# Patient Record
Sex: FEMALE | Race: BLACK OR AFRICAN AMERICAN | Employment: PART TIME | URBAN - METROPOLITAN AREA
[De-identification: names, ages, dates, MRNs, and addresses within clinical notes are randomized per-mention and may not be internally consistent; named-entity substitution may affect disease eponyms.]

---

## 2018-04-29 ENCOUNTER — APPOINTMENT (OUTPATIENT)
Dept: ULTRASOUND IMAGING | Facility: HOSPITAL | Age: 38
End: 2018-04-29
Attending: EMERGENCY MEDICINE
Payer: MEDICAID

## 2018-04-29 ENCOUNTER — HOSPITAL ENCOUNTER (EMERGENCY)
Facility: HOSPITAL | Age: 38
Discharge: HOME | End: 2018-04-29
Attending: EMERGENCY MEDICINE
Payer: MEDICAID

## 2018-04-29 VITALS
HEIGHT: 66 IN | DIASTOLIC BLOOD PRESSURE: 78 MMHG | RESPIRATION RATE: 14 BRPM | HEART RATE: 70 BPM | OXYGEN SATURATION: 99 % | TEMPERATURE: 98 F | SYSTOLIC BLOOD PRESSURE: 127 MMHG | BODY MASS INDEX: 22.18 KG/M2 | WEIGHT: 138 LBS

## 2018-04-29 DIAGNOSIS — O03.9 MISCARRIAGE: Primary | ICD-10-CM

## 2018-04-29 PROCEDURE — 84702 CHORIONIC GONADOTROPIN TEST: CPT | Performed by: EMERGENCY MEDICINE

## 2018-04-29 PROCEDURE — 76801 OB US < 14 WKS SINGLE FETUS: CPT | Performed by: EMERGENCY MEDICINE

## 2018-04-29 PROCEDURE — 99284 EMERGENCY DEPT VISIT MOD MDM: CPT

## 2018-04-29 PROCEDURE — 86850 RBC ANTIBODY SCREEN: CPT | Performed by: EMERGENCY MEDICINE

## 2018-04-29 PROCEDURE — 86900 BLOOD TYPING SEROLOGIC ABO: CPT | Performed by: EMERGENCY MEDICINE

## 2018-04-29 PROCEDURE — 36415 COLL VENOUS BLD VENIPUNCTURE: CPT

## 2018-04-29 PROCEDURE — 86901 BLOOD TYPING SEROLOGIC RH(D): CPT | Performed by: EMERGENCY MEDICINE

## 2018-04-29 PROCEDURE — 76817 TRANSVAGINAL US OBSTETRIC: CPT | Performed by: EMERGENCY MEDICINE

## 2018-04-29 PROCEDURE — 85025 COMPLETE CBC W/AUTO DIFF WBC: CPT | Performed by: EMERGENCY MEDICINE

## 2018-04-29 NOTE — ED INITIAL ASSESSMENT (HPI)
Pt to the ed with vaginal bleeding for the past two weeks pt is 7 weeks pregnant and she states that she seen her OB on 4/25/18 and was informed that she was having a miscarriage and given the option to allow her miscarriage to happen naturally.  Pt complai

## 2018-04-30 NOTE — ED PROVIDER NOTES
Patient Seen in: [facility] AND [facility] Emergency Department     History   No chief complaint on file.     Stated Complaint: Vaginal Bleeding     HPI    40year old female reported to be 7 weeks pregnant, but in the process of having a miscarriage–was evaluated Head: Normocephalic and atraumatic. Head is without raccoon's eyes, without right periorbital erythema and without left periorbital erythema.    Nose: Nose normal.   Mouth/Throat: Mucous membranes are normal. Mucous membranes are not pale and not cyanotic Narrative: The following orders were created for panel order TYPE AND SCREEN.   Procedure                               Abnormality         Status                     ---------                               -----------         ------ Sushila Martinez MD on 4/29/2018 at 20:35         Approved by (CST): Sushila Martinez MD on 4/29/2018 at 20:38                 ED Medications Administered: Medications - No data to display      Procedures: None     04/29/18  1720 04/29/18  19 2045  ------------------------------------------------------------    Impression:  After review and interpretation of the above emergency department workup, the patient was found to have Miscarriage  (primary encounter diagnosis)    Plan: General supportiv

## 2018-04-30 NOTE — ED NOTES
Pt presents to ED for eval of active miscarriage, c/o vaginal bleeding and lower abd camping. States she spoke with her OB and was educated about stated symptoms however reports increased bleeding.

## 2018-04-30 NOTE — ED NOTES
Pt stable for dc home. Pt given written and verbal dc instructions. Pt verbalizes understanding. Pt ambulatory with steady gait.

## (undated) NOTE — ED AVS SNAPSHOT
Eve Chirinos   MRN: [id number]    Department:  [facility] Emergency Department   Date of Visit:  4/29/2018           Disclosure     Insurance plans vary and the physician(s) referred by the ER may not be covered by your plan.  Please contact y CARE PHYSICIAN AT ONCE OR RETURN IMMEDIATELY TO THE EMERGENCY DEPARTMENT. If you have been prescribed any medication(s), please fill your prescription right away and begin taking the medication(s) as directed.   If you believe that any of the medications